# Patient Record
Sex: FEMALE | Race: BLACK OR AFRICAN AMERICAN | NOT HISPANIC OR LATINO | RURAL
[De-identification: names, ages, dates, MRNs, and addresses within clinical notes are randomized per-mention and may not be internally consistent; named-entity substitution may affect disease eponyms.]

---

## 2023-08-24 RX ORDER — OMEPRAZOLE 40 MG/1
40 CAPSULE, DELAYED RELEASE ORAL DAILY
COMMUNITY

## 2023-08-24 RX ORDER — DIVALPROEX SODIUM 500 MG/1
500 TABLET, FILM COATED, EXTENDED RELEASE ORAL DAILY
COMMUNITY

## 2023-08-24 RX ORDER — BENZTROPINE MESYLATE 1 MG/1
1 TABLET ORAL 2 TIMES DAILY
COMMUNITY

## 2023-08-24 RX ORDER — LEVETIRACETAM 500 MG/1
500 TABLET ORAL 2 TIMES DAILY
COMMUNITY

## 2023-08-24 RX ORDER — ALBUTEROL SULFATE 90 UG/1
2 AEROSOL, METERED RESPIRATORY (INHALATION) EVERY 6 HOURS PRN
COMMUNITY

## 2023-08-24 RX ORDER — FUROSEMIDE 20 MG/1
20 TABLET ORAL 2 TIMES DAILY
COMMUNITY

## 2023-08-24 RX ORDER — ASPIRIN 81 MG/1
81 TABLET ORAL DAILY
COMMUNITY

## 2023-08-24 RX ORDER — VENLAFAXINE HYDROCHLORIDE 150 MG/1
150 CAPSULE, EXTENDED RELEASE ORAL DAILY
COMMUNITY

## 2023-08-24 RX ORDER — LEVOTHYROXINE SODIUM 125 UG/1
125 TABLET ORAL
COMMUNITY

## 2023-08-24 RX ORDER — LITHIUM CARBONATE 300 MG/1
300 CAPSULE ORAL
COMMUNITY

## 2023-08-24 RX ORDER — PHENYTOIN SODIUM 300 MG/1
300 CAPSULE, EXTENDED RELEASE ORAL 3 TIMES DAILY
COMMUNITY

## 2023-08-24 RX ORDER — PRAVASTATIN SODIUM 20 MG/1
20 TABLET ORAL DAILY
COMMUNITY

## 2023-08-24 RX ORDER — CLONAZEPAM 0.5 MG/1
0.5 TABLET ORAL 2 TIMES DAILY PRN
COMMUNITY

## 2023-08-25 ENCOUNTER — OFFICE VISIT (OUTPATIENT)
Dept: NEUROLOGY | Facility: CLINIC | Age: 63
End: 2023-08-25
Payer: MEDICAID

## 2023-08-25 VITALS
SYSTOLIC BLOOD PRESSURE: 132 MMHG | OXYGEN SATURATION: 95 % | HEIGHT: 63 IN | BODY MASS INDEX: 33.66 KG/M2 | RESPIRATION RATE: 18 BRPM | WEIGHT: 190 LBS | HEART RATE: 84 BPM | DIASTOLIC BLOOD PRESSURE: 74 MMHG

## 2023-08-25 DIAGNOSIS — F41.9 ANXIETY AND DEPRESSION: ICD-10-CM

## 2023-08-25 DIAGNOSIS — F20.9 SCHIZOPHRENIA, UNSPECIFIED TYPE: ICD-10-CM

## 2023-08-25 DIAGNOSIS — R56.9 SEIZURES: Primary | ICD-10-CM

## 2023-08-25 DIAGNOSIS — F32.A ANXIETY AND DEPRESSION: ICD-10-CM

## 2023-08-25 DIAGNOSIS — F31.9 BIPOLAR 1 DISORDER: ICD-10-CM

## 2023-08-25 PROCEDURE — 3078F DIAST BP <80 MM HG: CPT | Mod: CPTII,,, | Performed by: PSYCHIATRY & NEUROLOGY

## 2023-08-25 PROCEDURE — 3078F PR MOST RECENT DIASTOLIC BLOOD PRESSURE < 80 MM HG: ICD-10-PCS | Mod: CPTII,,, | Performed by: PSYCHIATRY & NEUROLOGY

## 2023-08-25 PROCEDURE — 99215 OFFICE O/P EST HI 40 MIN: CPT | Mod: PBBFAC | Performed by: PSYCHIATRY & NEUROLOGY

## 2023-08-25 PROCEDURE — 1159F MED LIST DOCD IN RCRD: CPT | Mod: CPTII,,, | Performed by: PSYCHIATRY & NEUROLOGY

## 2023-08-25 PROCEDURE — 3008F PR BODY MASS INDEX (BMI) DOCUMENTED: ICD-10-PCS | Mod: CPTII,,, | Performed by: PSYCHIATRY & NEUROLOGY

## 2023-08-25 PROCEDURE — 3008F BODY MASS INDEX DOCD: CPT | Mod: CPTII,,, | Performed by: PSYCHIATRY & NEUROLOGY

## 2023-08-25 PROCEDURE — 3075F PR MOST RECENT SYSTOLIC BLOOD PRESS GE 130-139MM HG: ICD-10-PCS | Mod: CPTII,,, | Performed by: PSYCHIATRY & NEUROLOGY

## 2023-08-25 PROCEDURE — 3075F SYST BP GE 130 - 139MM HG: CPT | Mod: CPTII,,, | Performed by: PSYCHIATRY & NEUROLOGY

## 2023-08-25 PROCEDURE — 1159F PR MEDICATION LIST DOCUMENTED IN MEDICAL RECORD: ICD-10-PCS | Mod: CPTII,,, | Performed by: PSYCHIATRY & NEUROLOGY

## 2023-08-25 PROCEDURE — 99204 PR OFFICE/OUTPT VISIT, NEW, LEVL IV, 45-59 MIN: ICD-10-PCS | Mod: S$PBB,,, | Performed by: PSYCHIATRY & NEUROLOGY

## 2023-08-25 PROCEDURE — 99204 OFFICE O/P NEW MOD 45 MIN: CPT | Mod: S$PBB,,, | Performed by: PSYCHIATRY & NEUROLOGY

## 2023-08-25 RX ORDER — ACETAMINOPHEN 500 MG
500 TABLET ORAL EVERY 6 HOURS PRN
COMMUNITY

## 2023-08-25 RX ORDER — HALOPERIDOL 2 MG/1
2 TABLET ORAL
COMMUNITY

## 2023-08-25 RX ORDER — TRAZODONE HYDROCHLORIDE 50 MG/1
50 TABLET ORAL
COMMUNITY

## 2023-08-25 RX ORDER — LORAZEPAM 0.5 MG/1
0.5 TABLET ORAL
COMMUNITY

## 2023-08-25 RX ORDER — BUSPIRONE HYDROCHLORIDE 5 MG/1
5 TABLET ORAL
COMMUNITY

## 2023-08-25 RX ORDER — CYCLOSPORINE 0 G/ML
SOLUTION/ DROPS OPHTHALMIC; TOPICAL
COMMUNITY

## 2023-08-25 RX ORDER — ASCORBIC ACID 500 MG
500 TABLET ORAL
COMMUNITY

## 2023-08-25 RX ORDER — HYDROXYZINE HYDROCHLORIDE 25 MG/1
25 TABLET, FILM COATED ORAL
COMMUNITY

## 2023-08-25 NOTE — PATIENT INSTRUCTIONS
I have no Records of any verified previous seizure events  We will continue current seizure medication Keppra 500 twice daily   May consider tapering off Dilantin   Recommend she continue seeking psychiatric treatment for her extensive psych history.  Follow-up 3 months

## 2023-08-25 NOTE — PROGRESS NOTES
Subjective:       Patient ID: Supriya Black is a 62 y.o. female     Chief Complaint:    Chief Complaint   Patient presents with    Seizures        Allergies:  Patient has no known allergies.    Current Medications:    Outpatient Encounter Medications as of 8/25/2023   Medication Sig Dispense Refill    acetaminophen (TYLENOL) 500 MG tablet Take 500 mg by mouth every 6 (six) hours as needed.      albuterol (PROVENTIL/VENTOLIN HFA) 90 mcg/actuation inhaler Inhale 2 puffs into the lungs every 6 (six) hours as needed. Rescue      ascorbic acid, vitamin C, (VITAMIN C) 500 MG tablet Take 500 mg by mouth.      aspirin (ECOTRIN) 81 MG EC tablet Take 81 mg by mouth once daily.      benztropine (COGENTIN) 1 MG tablet Take 1 mg by mouth 2 (two) times daily.      busPIRone (BUSPAR) 5 MG Tab Take 5 mg by mouth.      clonazePAM (KLONOPIN) 0.5 MG tablet Take 0.5 mg by mouth 2 (two) times daily as needed.      cycloSPORINE (CEQUA) 0.09 % Dpet Apply to eye.      divalproex 500 MG Tb24 Take 500 mg by mouth once daily.      DM/Phenyleph/Pyril/Dexchlrophe (RESPERAL ORAL) Take 3 mg by mouth once daily.      furosemide (LASIX) 20 MG tablet Take 20 mg by mouth 2 (two) times daily.      haloperidoL (HALDOL) 2 MG tablet Take 2 mg by mouth.      hydrOXYzine HCL (ATARAX) 25 MG tablet Take 25 mg by mouth.      levETIRAcetam (KEPPRA) 500 MG Tab Take 500 mg by mouth 2 (two) times daily.      levothyroxine (SYNTHROID) 125 MCG tablet Take 125 mcg by mouth before breakfast.      lithium (ESKALITH) 300 MG capsule Take 300 mg by mouth 3 (three) times daily with meals.      LORazepam (ATIVAN) 0.5 MG tablet Take 0.5 mg by mouth.      multivitamin with minerals tablet Take 1 tablet by mouth.      omeprazole (PRILOSEC) 40 MG capsule Take 40 mg by mouth once daily.      phenytoin (DILANTIN) 300 MG ER capsule Take 300 mg by mouth 3 (three) times daily.      pravastatin (PRAVACHOL) 20 MG tablet Take 20 mg by mouth once daily.      traZODone (DESYREL) 50 MG  "tablet Take 50 mg by mouth.      venlafaxine (EFFEXOR-XR) 150 MG Cp24 Take 150 mg by mouth once daily.       No facility-administered encounter medications on file as of 8/25/2023.       History of Present Illness  62-year-old black female referred to clinic for evaluation of "seizures"?  I have no verified results are history to verify such in patient known as a poor historian.  She is on several psychiatric meds as well as seizure medications and has an extensive psych history of schizophrenia, anxiety disorder, depression, possible bipolar, convulsions.  Patient has a long history of bizarre behavior, disorganized thoughts and occasional hallucinations consistent with her schizophrenia.  MRI brain 8 mos ago comp [facility] from [facility]  Has reportedly had seizures since little girl but she denies ever losing bowel/bladder control or had tongue biting            Past Medical History:   Diagnosis Date    Anxiety disorder, unspecified     Convulsion     Schizophrenia, unspecified     Seizures        Past Surgical History:   Procedure Laterality Date    BREAST LUMPECTOMY         Social History  Ms. Black  reports that she has never smoked. She has never used smokeless tobacco.    Family History  Ms.'s Black family history is not on file.    Review of Systems  Review of Systems   Neurological:  Positive for tremors.   Psychiatric/Behavioral:  Positive for depression. The patient is nervous/anxious.    All other systems reviewed and are negative.     Objective:   /74 (BP Location: Left arm, Patient Position: Sitting, BP Method: Large (Manual))   Pulse 84   Resp 18   Ht 5' 3" (1.6 m)   Wt 86.2 kg (190 lb)   LMP  (LMP Unknown)   SpO2 95%   BMI 33.66 kg/m²    NEUROLOGICAL EXAMINATION:     MENTAL STATUS   Oriented to person, place, and time.   Level of consciousness: drowsy  Knowledge: consistent with education.        Depression/anxiety/schizophrenia     CRANIAL NERVES   Cranial nerves II through XII intact. "     MOTOR EXAM     Strength   Strength 5/5 throughout.     GAIT AND COORDINATION     Gait  Gait: normal       Physical Exam  Vitals reviewed.   Constitutional:       Appearance: She is normal weight.   Neurological:      General: No focal deficit present.      Mental Status: She is alert and oriented to person, place, and time. Mental status is at baseline.      Cranial Nerves: Cranial nerves 2-12 are intact.      Motor: Motor strength is normal.     Gait: Gait is intact.          Assessment:     Seizures    Anxiety and depression    Schizophrenia, unspecified type    Bipolar 1 disorder         Primary Diagnosis and ICD10  Seizures [R56.9]    Plan:     Patient Instructions   I have no Records of any verified previous seizure events  We will continue current seizure medication Keppra 500 twice daily   May consider tapering off Dilantin   Recommend she continue seeking psychiatric treatment for her extensive psych history.  Follow-up 3 months     There are no discontinued medications.    Requested Prescriptions      No prescriptions requested or ordered in this encounter

## 2023-09-06 ENCOUNTER — TELEPHONE (OUTPATIENT)
Dept: NEUROLOGY | Facility: CLINIC | Age: 63
End: 2023-09-06
Payer: MEDICAID

## 2023-09-06 NOTE — TELEPHONE ENCOUNTER
Ms. Black & her caregiver called and spoke to a member of our staff stating they were confirming the correct dose for weaning pt off of Keppra.  I called back and spoke to pt who is easily confused.  Pt requests a call to her pharmacy to explain to them & they can print instructions on her bottle.  Left VM with pharmacy for Keppra now at 1 a day for 2 weeks, then 1 qod for 2 weeks, then stop.  Pt now taking 2 per day.  Message to call me if pharmacy has any questions etc.

## 2023-09-07 ENCOUNTER — TELEPHONE (OUTPATIENT)
Dept: NEUROLOGY | Facility: CLINIC | Age: 63
End: 2023-09-07
Payer: MEDICAID

## 2023-09-07 NOTE — TELEPHONE ENCOUNTER
Pharmacy & I are missing each other and leaving messages.  Provider wants pt to take Dilantin 200mg every day x 2 weeks, then 100mg every day x 2 weeks, then 100mg every other day for 2 weeks and then stop.  Left VM with Pharmacy.

## 2023-09-07 NOTE — TELEPHONE ENCOUNTER
Telephone note from yesterday listed Keppra but should have said Dilantin.  She is to continue on Keppra and is weaning off Dilantin.  Pt was give correct med directions

## 2023-10-20 ENCOUNTER — TELEPHONE (OUTPATIENT)
Dept: NEUROLOGY | Facility: CLINIC | Age: 63
End: 2023-10-20
Payer: MEDICAID

## 2023-10-20 NOTE — TELEPHONE ENCOUNTER
Pt calls almost daily about needing seizure medication that Dr. Gaines had her wean off & discontinue.  She is still taking Keppra but Dilantin was stopped.  She had normal EEG and does not have s/s of seizures and admits to anxiety over stopping Dilantin.  Provider notified every day of her calls and is not restarting Dilantin.  Offered sooner appt but pt declined.  Has follow up appt on 11/28.

## 2023-11-28 ENCOUNTER — OFFICE VISIT (OUTPATIENT)
Dept: NEUROLOGY | Facility: CLINIC | Age: 63
End: 2023-11-28
Payer: MEDICAID

## 2023-11-28 VITALS
SYSTOLIC BLOOD PRESSURE: 114 MMHG | OXYGEN SATURATION: 98 % | HEART RATE: 78 BPM | BODY MASS INDEX: 33.66 KG/M2 | WEIGHT: 190 LBS | DIASTOLIC BLOOD PRESSURE: 78 MMHG | HEIGHT: 63 IN | RESPIRATION RATE: 18 BRPM | TEMPERATURE: 97 F

## 2023-11-28 DIAGNOSIS — R56.9 SEIZURES: Primary | ICD-10-CM

## 2023-11-28 PROCEDURE — 3078F DIAST BP <80 MM HG: CPT | Mod: CPTII,,, | Performed by: PSYCHIATRY & NEUROLOGY

## 2023-11-28 PROCEDURE — 99215 OFFICE O/P EST HI 40 MIN: CPT | Mod: PBBFAC | Performed by: PSYCHIATRY & NEUROLOGY

## 2023-11-28 PROCEDURE — 99214 PR OFFICE/OUTPT VISIT, EST, LEVL IV, 30-39 MIN: ICD-10-PCS | Mod: S$PBB,,, | Performed by: PSYCHIATRY & NEUROLOGY

## 2023-11-28 PROCEDURE — 3078F PR MOST RECENT DIASTOLIC BLOOD PRESSURE < 80 MM HG: ICD-10-PCS | Mod: CPTII,,, | Performed by: PSYCHIATRY & NEUROLOGY

## 2023-11-28 PROCEDURE — 3074F PR MOST RECENT SYSTOLIC BLOOD PRESSURE < 130 MM HG: ICD-10-PCS | Mod: CPTII,,, | Performed by: PSYCHIATRY & NEUROLOGY

## 2023-11-28 PROCEDURE — 1160F PR REVIEW ALL MEDS BY PRESCRIBER/CLIN PHARMACIST DOCUMENTED: ICD-10-PCS | Mod: CPTII,,, | Performed by: PSYCHIATRY & NEUROLOGY

## 2023-11-28 PROCEDURE — 1159F PR MEDICATION LIST DOCUMENTED IN MEDICAL RECORD: ICD-10-PCS | Mod: CPTII,,, | Performed by: PSYCHIATRY & NEUROLOGY

## 2023-11-28 PROCEDURE — 3008F BODY MASS INDEX DOCD: CPT | Mod: CPTII,,, | Performed by: PSYCHIATRY & NEUROLOGY

## 2023-11-28 PROCEDURE — 1159F MED LIST DOCD IN RCRD: CPT | Mod: CPTII,,, | Performed by: PSYCHIATRY & NEUROLOGY

## 2023-11-28 PROCEDURE — 3008F PR BODY MASS INDEX (BMI) DOCUMENTED: ICD-10-PCS | Mod: CPTII,,, | Performed by: PSYCHIATRY & NEUROLOGY

## 2023-11-28 PROCEDURE — 1160F RVW MEDS BY RX/DR IN RCRD: CPT | Mod: CPTII,,, | Performed by: PSYCHIATRY & NEUROLOGY

## 2023-11-28 PROCEDURE — 99214 OFFICE O/P EST MOD 30 MIN: CPT | Mod: S$PBB,,, | Performed by: PSYCHIATRY & NEUROLOGY

## 2023-11-28 PROCEDURE — 3074F SYST BP LT 130 MM HG: CPT | Mod: CPTII,,, | Performed by: PSYCHIATRY & NEUROLOGY

## 2023-11-28 NOTE — PATIENT INSTRUCTIONS
We will continue Keppra 500 mg twice daily for seizure prevention   Avoid any triggers for seizure activity   Improve healthy lifestyle habits and improve proper sleep  Continue no driving  Needs routine psychiatric follow-up and evaluation for multiple psychiatric issues  Follow-up six-months

## 2023-11-28 NOTE — PROGRESS NOTES
Subjective:       Patient ID: Supriya Black is a 63 y.o. female     Chief Complaint:    Chief Complaint   Patient presents with    Follow-up     Seizures        Allergies:  Patient has no known allergies.    Current Medications:    Outpatient Encounter Medications as of 11/28/2023   Medication Sig Dispense Refill    acetaminophen (TYLENOL) 500 MG tablet Take 500 mg by mouth every 6 (six) hours as needed.      albuterol (PROVENTIL/VENTOLIN HFA) 90 mcg/actuation inhaler Inhale 2 puffs into the lungs every 6 (six) hours as needed. Rescue      ascorbic acid, vitamin C, (VITAMIN C) 500 MG tablet Take 500 mg by mouth.      aspirin (ECOTRIN) 81 MG EC tablet Take 81 mg by mouth once daily.      benztropine (COGENTIN) 1 MG tablet Take 1 mg by mouth 2 (two) times daily.      busPIRone (BUSPAR) 5 MG Tab Take 5 mg by mouth.      clonazePAM (KLONOPIN) 0.5 MG tablet Take 0.5 mg by mouth 2 (two) times daily as needed.      cycloSPORINE (CEQUA) 0.09 % Dpet Apply to eye.      divalproex 500 MG Tb24 Take 500 mg by mouth once daily.      DM/Phenyleph/Pyril/Dexchlrophe (RESPERAL ORAL) Take 3 mg by mouth once daily.      furosemide (LASIX) 20 MG tablet Take 20 mg by mouth 2 (two) times daily.      haloperidoL (HALDOL) 2 MG tablet Take 2 mg by mouth.      hydrOXYzine HCL (ATARAX) 25 MG tablet Take 25 mg by mouth.      levETIRAcetam (KEPPRA) 500 MG Tab Take 500 mg by mouth 2 (two) times daily.      levothyroxine (SYNTHROID) 125 MCG tablet Take 125 mcg by mouth before breakfast.      lithium (ESKALITH) 300 MG capsule Take 300 mg by mouth 3 (three) times daily with meals.      LORazepam (ATIVAN) 0.5 MG tablet Take 0.5 mg by mouth.      multivitamin with minerals tablet Take 1 tablet by mouth.      omeprazole (PRILOSEC) 40 MG capsule Take 40 mg by mouth once daily.      phenytoin (DILANTIN) 300 MG ER capsule Take 300 mg by mouth 3 (three) times daily.      pravastatin (PRAVACHOL) 20 MG tablet Take 20 mg by mouth once daily.      traZODone  "(DESYREL) 50 MG tablet Take 50 mg by mouth.      venlafaxine (EFFEXOR-XR) 150 MG Cp24 Take 150 mg by mouth once daily.       No facility-administered encounter medications on file as of 11/28/2023.       History of Present Illness  63-year-old black female with an extensive psychiatric history of schizophrenia, bipolar, generalized anxiety disorder and severe depression in clinic for follow-up regarding seizure disorder.  Patient has very loose and disorganized thoughts given her above psychiatric history and had been on Dilantin and Keppra for many years.  I kept her on the Keppra 500 mg b.i.d. for seizure prophylaxis but tapered her off of Dilantin given her low in significant dosage and the neurotoxicity regarding such.  She states she has had seizures since she was a little girl but denies ever having convulsions, loss of bowel/bladder function or tongue biting?  Unfortunately this lady is a very poor historian secondary to her significant and unfortunate psychiatric history  She is unable to give exacting details for her spells ?          Past Medical History:   Diagnosis Date    Anxiety disorder, unspecified     Convulsion     Schizophrenia, unspecified     Seizures        Past Surgical History:   Procedure Laterality Date    BREAST LUMPECTOMY         Social History  Ms. Black  reports that she has never smoked. She has never used smokeless tobacco.    Family History  Ms.'s Black family history is not on file.    Review of Systems  Review of Systems   Neurological:  Positive for seizures.   Psychiatric/Behavioral:  Positive for depression and hallucinations. The patient is nervous/anxious.    All other systems reviewed and are negative.     Objective:   /78 (BP Location: Left arm, Patient Position: Sitting, BP Method: Large (Automatic))   Pulse 78   Temp 97.1 °F (36.2 °C)   Resp 18   Ht 5' 3" (1.6 m)   Wt 86.2 kg (190 lb)   LMP  (LMP Unknown)   SpO2 98%   BMI 33.66 kg/m²    NEUROLOGICAL " EXAMINATION:     MENTAL STATUS   Oriented to person, place, and time.   Attention: decreased. Concentration: decreased.   Speech: slurred   Level of consciousness: alert  Knowledge: poor.        Severe psychiatric issues with schizophrenia, generalized anxiety disorder, major depression     CRANIAL NERVES   Cranial nerves II through XII intact.     MOTOR EXAM     Strength   Strength 5/5 throughout.     GAIT AND COORDINATION     Gait  Gait: normal       Physical Exam  Vitals reviewed.   Constitutional:       Appearance: She is obese.   Neurological:      General: No focal deficit present.      Mental Status: She is alert and oriented to person, place, and time. Mental status is at baseline.      Cranial Nerves: Cranial nerves 2-12 are intact.      Motor: Motor strength is normal.     Gait: Gait is intact.   Psychiatric:         Speech: Speech is slurred.          Assessment:     Seizures         Primary Diagnosis and ICD10  Seizures [R56.9]    Plan:     Patient Instructions   We will continue Keppra 500 mg twice daily for seizure prevention   Avoid any triggers for seizure activity   Improve healthy lifestyle habits and improve proper sleep  Continue no driving  Needs routine psychiatric follow-up and evaluation for multiple psychiatric issues  Follow-up six-months    There are no discontinued medications.    Requested Prescriptions      No prescriptions requested or ordered in this encounter

## 2024-01-01 ENCOUNTER — HOSPITAL ENCOUNTER (EMERGENCY)
Facility: HOSPITAL | Age: 64
End: 2024-10-24
Attending: EMERGENCY MEDICINE
Payer: COMMERCIAL

## 2024-01-01 VITALS
SYSTOLIC BLOOD PRESSURE: 186 MMHG | DIASTOLIC BLOOD PRESSURE: 80 MMHG | HEIGHT: 63 IN | TEMPERATURE: 99 F | WEIGHT: 200 LBS | RESPIRATION RATE: 14 BRPM | BODY MASS INDEX: 35.44 KG/M2

## 2024-01-01 DIAGNOSIS — I46.9 CARDIAC ARREST: Primary | ICD-10-CM

## 2024-01-01 LAB
HCO3 UR-SCNC: 20.8 MMOL/L (ref 24–28)
HCT VFR BLD CALC: 35 % (ref 35–51)
LDH SERPL L TO P-CCNC: 8.5 MMOL/L (ref 0.3–1.2)
PCO2 BLDA: 77 MMHG (ref 41–51)
PH SMN: 7.04 [PH] (ref 7.32–7.42)
PO2 BLDA: 11 MMHG (ref 25–40)
POC BASE EXCESS: -10.5 MMOL/L (ref -2–3)
POC CO2: 23.2 MMOL/L
POC IONIZED CALCIUM: 1.17 MMOL/L (ref 1.15–1.35)
POC SATURATED O2: 5 % (ref 40–70)
POCT GLUCOSE: 179 MG/DL (ref 60–95)
POTASSIUM BLD-SCNC: 3.7 MMOL/L (ref 3.4–4.5)
SODIUM BLD-SCNC: 137 MMOL/L (ref 136–145)

## 2024-01-01 PROCEDURE — 99284 EMERGENCY DEPT VISIT MOD MDM: CPT | Mod: 25

## 2024-01-01 PROCEDURE — 82947 ASSAY GLUCOSE BLOOD QUANT: CPT

## 2024-01-01 PROCEDURE — 85014 HEMATOCRIT: CPT

## 2024-01-01 PROCEDURE — 84295 ASSAY OF SERUM SODIUM: CPT

## 2024-01-01 PROCEDURE — 82330 ASSAY OF CALCIUM: CPT

## 2024-01-01 PROCEDURE — 96375 TX/PRO/DX INJ NEW DRUG ADDON: CPT

## 2024-01-01 PROCEDURE — 63600175 PHARM REV CODE 636 W HCPCS: Performed by: EMERGENCY MEDICINE

## 2024-01-01 PROCEDURE — 82803 BLOOD GASES ANY COMBINATION: CPT

## 2024-01-01 PROCEDURE — 83605 ASSAY OF LACTIC ACID: CPT

## 2024-01-01 PROCEDURE — 99900035 HC TECH TIME PER 15 MIN (STAT)

## 2024-01-01 PROCEDURE — 84132 ASSAY OF SERUM POTASSIUM: CPT

## 2024-01-01 PROCEDURE — 96374 THER/PROPH/DIAG INJ IV PUSH: CPT

## 2024-01-01 PROCEDURE — 25000003 PHARM REV CODE 250: Performed by: EMERGENCY MEDICINE

## 2024-01-01 RX ORDER — CALCIUM CHLORIDE INJECTION 100 MG/ML
INJECTION, SOLUTION INTRAVENOUS CODE/TRAUMA/SEDATION MEDICATION
Status: COMPLETED | OUTPATIENT
Start: 2024-01-01 | End: 2024-01-01

## 2024-01-01 RX ORDER — EPINEPHRINE 0.1 MG/ML
INJECTION INTRAVENOUS CODE/TRAUMA/SEDATION MEDICATION
Status: COMPLETED | OUTPATIENT
Start: 2024-01-01 | End: 2024-01-01

## 2024-01-01 RX ORDER — SODIUM BICARBONATE 1 MEQ/ML
SYRINGE (ML) INTRAVENOUS CODE/TRAUMA/SEDATION MEDICATION
Status: COMPLETED | OUTPATIENT
Start: 2024-01-01 | End: 2024-01-01

## 2024-01-01 RX ADMIN — EPINEPHRINE 1 MG: 0.1 INJECTION, SOLUTION ENDOTRACHEAL; INTRACARDIAC; INTRAVENOUS at 09:10

## 2024-01-01 RX ADMIN — SODIUM BICARBONATE 50 MEQ: 84 INJECTION INTRAVENOUS at 09:10

## 2024-01-01 RX ADMIN — CALCIUM CHLORIDE 1 G: 100 INJECTION INTRAVENOUS; INTRAVENTRICULAR at 09:10

## 2024-05-28 ENCOUNTER — OFFICE VISIT (OUTPATIENT)
Dept: NEUROLOGY | Facility: CLINIC | Age: 64
End: 2024-05-28
Payer: MEDICAID

## 2024-05-28 VITALS
RESPIRATION RATE: 18 BRPM | DIASTOLIC BLOOD PRESSURE: 61 MMHG | WEIGHT: 200 LBS | BODY MASS INDEX: 35.44 KG/M2 | HEIGHT: 63 IN | OXYGEN SATURATION: 91 % | HEART RATE: 103 BPM | SYSTOLIC BLOOD PRESSURE: 132 MMHG

## 2024-05-28 DIAGNOSIS — F41.1 GENERALIZED ANXIETY DISORDER: ICD-10-CM

## 2024-05-28 DIAGNOSIS — Z87.898 HISTORY OF SEIZURE: Primary | ICD-10-CM

## 2024-05-28 DIAGNOSIS — F20.0 PARANOID SCHIZOPHRENIA: ICD-10-CM

## 2024-05-28 PROCEDURE — 1159F MED LIST DOCD IN RCRD: CPT | Mod: CPTII,,, | Performed by: PSYCHIATRY & NEUROLOGY

## 2024-05-28 PROCEDURE — 99215 OFFICE O/P EST HI 40 MIN: CPT | Mod: PBBFAC | Performed by: PSYCHIATRY & NEUROLOGY

## 2024-05-28 PROCEDURE — 99214 OFFICE O/P EST MOD 30 MIN: CPT | Mod: S$PBB,,, | Performed by: PSYCHIATRY & NEUROLOGY

## 2024-05-28 PROCEDURE — 1160F RVW MEDS BY RX/DR IN RCRD: CPT | Mod: CPTII,,, | Performed by: PSYCHIATRY & NEUROLOGY

## 2024-05-28 PROCEDURE — 3078F DIAST BP <80 MM HG: CPT | Mod: CPTII,,, | Performed by: PSYCHIATRY & NEUROLOGY

## 2024-05-28 PROCEDURE — 3008F BODY MASS INDEX DOCD: CPT | Mod: CPTII,,, | Performed by: PSYCHIATRY & NEUROLOGY

## 2024-05-28 PROCEDURE — 3075F SYST BP GE 130 - 139MM HG: CPT | Mod: CPTII,,, | Performed by: PSYCHIATRY & NEUROLOGY

## 2024-05-28 RX ORDER — DOCUSATE SODIUM 100 MG/1
100 CAPSULE, LIQUID FILLED ORAL
COMMUNITY
Start: 2024-04-03

## 2024-05-28 RX ORDER — LEVETIRACETAM 500 MG/1
500 TABLET ORAL 2 TIMES DAILY
Qty: 180 TABLET | Refills: 3 | Status: SHIPPED | OUTPATIENT
Start: 2024-05-28

## 2024-05-28 RX ORDER — LOVASTATIN 20 MG/1
20 TABLET ORAL
COMMUNITY
Start: 2024-01-05

## 2024-05-28 NOTE — PATIENT INSTRUCTIONS
Continue current psych meds   Recommend continued psych evaluation for multiple disorders   Continue Keppra 500 mg twice daily  Control risk factors   Maintain healthy lifestyle and good sleep habits   Follow-up six-months

## 2024-05-28 NOTE — PROGRESS NOTES
Subjective:       Patient ID: Supriya Black is a 63 y.o. female     Chief Complaint:  No chief complaint on file.       Allergies:  Patient has no known allergies.    Current Medications:    Outpatient Encounter Medications as of 5/28/2024   Medication Sig Dispense Refill    docusate sodium (COLACE) 100 MG capsule Take 100 mg by mouth.      lovastatin (MEVACOR) 20 MG tablet Take 20 mg by mouth.      acetaminophen (TYLENOL) 500 MG tablet Take 500 mg by mouth every 6 (six) hours as needed.      albuterol (PROVENTIL/VENTOLIN HFA) 90 mcg/actuation inhaler Inhale 2 puffs into the lungs every 6 (six) hours as needed. Rescue      ascorbic acid, vitamin C, (VITAMIN C) 500 MG tablet Take 500 mg by mouth.      aspirin (ECOTRIN) 81 MG EC tablet Take 81 mg by mouth once daily.      benztropine (COGENTIN) 1 MG tablet Take 1 mg by mouth 2 (two) times daily.      busPIRone (BUSPAR) 5 MG Tab Take 5 mg by mouth.      clonazePAM (KLONOPIN) 0.5 MG tablet Take 0.5 mg by mouth 2 (two) times daily as needed.      cycloSPORINE (CEQUA) 0.09 % Dpet Apply to eye.      divalproex 500 MG Tb24 Take 500 mg by mouth once daily.      DM/Phenyleph/Pyril/Dexchlrophe (RESPERAL ORAL) Take 3 mg by mouth once daily.      furosemide (LASIX) 20 MG tablet Take 20 mg by mouth 2 (two) times daily.      haloperidoL (HALDOL) 2 MG tablet Take 2 mg by mouth.      hydrOXYzine HCL (ATARAX) 25 MG tablet Take 25 mg by mouth.      levETIRAcetam (KEPPRA) 500 MG Tab Take 1 tablet (500 mg total) by mouth 2 (two) times daily. 180 tablet 3    levothyroxine (SYNTHROID) 125 MCG tablet Take 125 mcg by mouth before breakfast.      lithium (ESKALITH) 300 MG capsule Take 300 mg by mouth 3 (three) times daily with meals.      LORazepam (ATIVAN) 0.5 MG tablet Take 0.5 mg by mouth.      multivitamin with minerals tablet Take 1 tablet by mouth.      omeprazole (PRILOSEC) 40 MG capsule Take 40 mg by mouth once daily.      pravastatin (PRAVACHOL) 20 MG tablet Take 20 mg by mouth  once daily.      traZODone (DESYREL) 50 MG tablet Take 50 mg by mouth.      venlafaxine (EFFEXOR-XR) 150 MG Cp24 Take 150 mg by mouth once daily.      [DISCONTINUED] levETIRAcetam (KEPPRA) 500 MG Tab Take 500 mg by mouth 2 (two) times daily.      [DISCONTINUED] phenytoin (DILANTIN) 300 MG ER capsule Take 300 mg by mouth 3 (three) times daily.       No facility-administered encounter medications on file as of 5/28/2024.       History of Present Illness  63-year-old black female in clinic for follow-up evaluation of possible seizure disorder?  Patient has an extensive premorbid psychiatric history with lifelong schizophrenia, generalized anxiety disorder, depression, bipolar disorder, schizoaffective mood disorder etc. she had been on Dilantin and Keppra for years however Dilantin compliance was often in question with multiple subtherapeutic levels noted and patient denies ever having any actual symptoms of a generalized tonic-clonic seizure event as she denies any loss of bowel or bladder function denies any tongue biting and denies any postictal confusion, thus making one  suspicious for the actual diagnosis.  I have maintain her on Keppra 500 b.i.d. as prophylaxis and satiation of her concerns   She is on multiple other medications but compliance is in question ?  Pt nor friend can give any exacting details of her seizures ?           Past Medical History:   Diagnosis Date    Anxiety disorder, unspecified     Convulsion     Schizophrenia, unspecified     Seizures        Past Surgical History:   Procedure Laterality Date    BREAST LUMPECTOMY         Social History  Ms. Black  reports that she has never smoked. She has never used smokeless tobacco.    Family History  Ms.'s Black family history is not on file.    Review of Systems  Review of Systems   Neurological:  Positive for sensory change.   Psychiatric/Behavioral:  Positive for depression and hallucinations. The patient is nervous/anxious.    All other systems  "reviewed and are negative.     Objective:   /61 (BP Location: Left arm, Patient Position: Sitting, BP Method: Large (Manual))   Pulse 103   Resp 18   Ht 5' 3" (1.6 m)   Wt 90.7 kg (200 lb)   SpO2 (!) 91%   BMI 35.43 kg/m²    NEUROLOGICAL EXAMINATION:     MENTAL STATUS   Disoriented to year, month and date.   Registration: recalls 1 of 3 objects.   Level of consciousness: drowsy  Knowledge: consistent with education.        Severe psychiatric diagnoses      CRANIAL NERVES   Cranial nerves II through XII intact.     MOTOR EXAM     Strength   Strength 5/5 throughout.     GAIT AND COORDINATION     Gait  Gait: normal       Physical Exam  Vitals reviewed.   Constitutional:       Appearance: She is obese.   Neurological:      Mental Status: She is alert. Mental status is at baseline.      Cranial Nerves: Cranial nerves 2-12 are intact.      Motor: Motor strength is normal.     Gait: Gait is intact.          Assessment:     History of seizure  Comments:  Unverified able diagnosis-possible psychogenic nature given extensive psych history-we will maintain Keppra for prophylaxis    Paranoid schizophrenia    Generalized anxiety disorder    Other orders  -     levETIRAcetam (KEPPRA) 500 MG Tab; Take 1 tablet (500 mg total) by mouth 2 (two) times daily.  Dispense: 180 tablet; Refill: 3         Primary Diagnosis and ICD10  History of seizure [Z87.898]    Plan:     Patient Instructions   Continue current psych meds   Recommend continued psych evaluation for multiple disorders   Continue Keppra 500 mg twice daily  Control risk factors   Maintain healthy lifestyle and good sleep habits   Follow-up six-months    Medications Discontinued During This Encounter   Medication Reason    phenytoin (DILANTIN) 300 MG ER capsule     levETIRAcetam (KEPPRA) 500 MG Tab Reorder       Requested Prescriptions     Signed Prescriptions Disp Refills    levETIRAcetam (KEPPRA) 500 MG Tab 180 tablet 3     Sig: Take 1 tablet (500 mg total) by " mouth 2 (two) times daily.

## 2024-10-24 PROBLEM — I46.9 CARDIAC ARREST: Status: ACTIVE | Noted: 2024-01-01
